# Patient Record
Sex: FEMALE | Race: BLACK OR AFRICAN AMERICAN | NOT HISPANIC OR LATINO | URBAN - METROPOLITAN AREA
[De-identification: names, ages, dates, MRNs, and addresses within clinical notes are randomized per-mention and may not be internally consistent; named-entity substitution may affect disease eponyms.]

---

## 2021-12-18 ENCOUNTER — IMMUNIZATION (OUTPATIENT)
Dept: VACCINE CLINIC | Facility: HOSPITAL | Age: 51
End: 2021-12-18

## 2021-12-18 PROCEDURE — 91300 HC SARSCOV02 VAC 30MCG/0.3ML IM: CPT | Performed by: INTERNAL MEDICINE

## 2021-12-18 PROCEDURE — 0004A HC ADM SARSCOV2 30MCG/0.3ML BOOSTER: CPT | Performed by: INTERNAL MEDICINE

## 2022-10-17 ENCOUNTER — TRANSCRIBE ORDERS (OUTPATIENT)
Dept: ADMINISTRATIVE | Facility: HOSPITAL | Age: 52
End: 2022-10-17

## 2022-10-17 DIAGNOSIS — R91.1 PULMONARY NODULE: Primary | ICD-10-CM

## 2023-07-25 ENCOUNTER — OFFICE VISIT (OUTPATIENT)
Dept: FAMILY MEDICINE CLINIC | Facility: CLINIC | Age: 53
End: 2023-07-25
Payer: COMMERCIAL

## 2023-07-25 VITALS
SYSTOLIC BLOOD PRESSURE: 114 MMHG | BODY MASS INDEX: 31.98 KG/M2 | RESPIRATION RATE: 16 BRPM | WEIGHT: 211 LBS | HEIGHT: 68 IN | TEMPERATURE: 98 F | DIASTOLIC BLOOD PRESSURE: 80 MMHG | OXYGEN SATURATION: 100 % | HEART RATE: 65 BPM

## 2023-07-25 DIAGNOSIS — M79.2 NEUROPATHIC PAIN: ICD-10-CM

## 2023-07-25 DIAGNOSIS — B02.8 HERPES ZOSTER WITH OTHER COMPLICATION: Primary | ICD-10-CM

## 2023-07-25 PROBLEM — I10 PRIMARY HYPERTENSION: Status: ACTIVE | Noted: 2023-07-25

## 2023-07-25 PROBLEM — H25.13 AGE-RELATED NUCLEAR CATARACT OF BOTH EYES: Status: ACTIVE | Noted: 2022-09-21

## 2023-07-25 PROBLEM — R73.03 PREDIABETES: Status: ACTIVE | Noted: 2023-07-25

## 2023-07-25 PROBLEM — B02.9 HERPES ZOSTER WITHOUT COMPLICATION: Status: ACTIVE | Noted: 2023-07-25

## 2023-07-25 PROBLEM — H52.4 PRESBYOPIA: Status: ACTIVE | Noted: 2022-09-21

## 2023-07-25 PROBLEM — E66.01 MORBID OBESITY: Status: ACTIVE | Noted: 2022-10-24

## 2023-07-25 PROCEDURE — 99203 OFFICE O/P NEW LOW 30 MIN: CPT | Performed by: NURSE PRACTITIONER

## 2023-07-25 RX ORDER — METHOCARBAMOL 750 MG/1
750 TABLET, FILM COATED ORAL
COMMUNITY
Start: 2023-07-05

## 2023-07-25 RX ORDER — GABAPENTIN 100 MG/1
100 CAPSULE ORAL 2 TIMES DAILY
Qty: 60 CAPSULE | Refills: 0 | Status: SHIPPED | OUTPATIENT
Start: 2023-07-25

## 2023-07-25 RX ORDER — LIDOCAINE 50 MG/G
1 PATCH TOPICAL EVERY 12 HOURS
Qty: 60 PATCH | Refills: 11 | COMMUNITY
Start: 2023-07-19 | End: 2024-07-18

## 2023-07-25 RX ORDER — AMLODIPINE BESYLATE 2.5 MG/1
TABLET ORAL
COMMUNITY
Start: 2023-07-09

## 2023-07-25 RX ORDER — PROGESTERONE 100 MG/1
CAPSULE ORAL
COMMUNITY
Start: 2023-07-08

## 2023-07-25 RX ORDER — DULOXETIN HYDROCHLORIDE 60 MG/1
60 CAPSULE, DELAYED RELEASE ORAL DAILY
Qty: 30 CAPSULE | Refills: 11 | COMMUNITY
Start: 2023-07-19 | End: 2024-07-18

## 2023-07-25 RX ORDER — VALACYCLOVIR HYDROCHLORIDE 500 MG/1
500 TABLET, FILM COATED ORAL 2 TIMES DAILY
Qty: 14 TABLET | Refills: 0 | Status: SHIPPED | OUTPATIENT
Start: 2023-07-25 | End: 2023-08-01

## 2023-07-25 RX ORDER — ATORVASTATIN CALCIUM 40 MG/1
TABLET, FILM COATED ORAL
COMMUNITY
Start: 2023-07-02

## 2023-07-25 RX ORDER — SEMAGLUTIDE 2.4 MG/.75ML
INJECTION, SOLUTION SUBCUTANEOUS
COMMUNITY
Start: 2023-07-03

## 2023-07-25 RX ORDER — SENNOSIDES 8.6 MG
CAPSULE ORAL
COMMUNITY

## 2023-07-25 NOTE — PROGRESS NOTES
Thao Wolfe is a 52 y.o.. female.     Patient here today to become established.    Patient here today due to ongoing shingles and neuropathic pain from shingles. Patient stating she originally went to  on  for pain in right hip and right leg and was diagnosed with OA of right leg; Patient stating she was given muscle relaxer and prednisone for treatment. Patient stating then she went to ER on  after developing rash to right leg. Patient stating they did U/S and ruled out DVT's. Patient stating she was given Valtrex and prednisone script and discharged.     Patient stating she has history of shingles when she was in her 30's, but not as severe as this episode.       The following portions of the patient's history were reviewed and updated as appropriate: allergies, current medications, past family history, past medical history, past social history, past surgical history and problem list.    Past Medical History:   Diagnosis Date    Age-related nuclear cataract of both eyes 2022    Last Assessment & Plan: Formatting of this note might be different from the original. Not visually significant. Recommended observation. Patient cleared for refraction and glasses.    Herpes zoster without complication 2023    Morbid obesity 10/24/2022    Prediabetes 2023    Presbyopia 2022    Last Assessment & Plan: Formatting of this note might be different from the original. Fit with [facility] CL OS. Will call back with progress OS. Finalized RX. Okay to order supply of lenses or send CL rx if patient is happy.    Primary hypertension 2023       Past Surgical History:   Procedure Laterality Date     SECTION      HYSTERECTOMY      full at age 39       Review of Systems   Constitutional:  Negative for fever.   Respiratory: Negative.     Cardiovascular: Negative.    Musculoskeletal:  Positive for arthralgias.     Allergies   Allergen Reactions    Codeine Nausea Only    Sulfa  "Antibiotics Nausea Only       Objective     Vitals:    07/25/23 1517 07/25/23 1549   BP: 140/70 114/80   Pulse: 65    Resp: 16    Temp: 98 °F (36.7 °C)    SpO2: 100%    Weight: 95.7 kg (211 lb)    Height: 172.7 cm (68\")      Body mass index is 32.08 kg/m².    Physical Exam  Vitals reviewed.   HENT:      Head: Normocephalic.   Eyes:      Pupils: Pupils are equal, round, and reactive to light.   Cardiovascular:      Rate and Rhythm: Normal rate and regular rhythm.   Pulmonary:      Effort: Pulmonary effort is normal.      Breath sounds: Normal breath sounds.   Musculoskeletal:         General: Normal range of motion.   Skin:     General: Skin is warm and dry.      Comments: Healing red macular rash noted to right thigh/lower leg   Neurological:      Mental Status: She is alert and oriented to person, place, and time.   Psychiatric:         Behavior: Behavior normal.         Current Outpatient Medications:     amLODIPine (NORVASC) 2.5 MG tablet, , Disp: , Rfl:     atorvastatin (LIPITOR) 40 MG tablet, , Disp: , Rfl:     lidocaine (LIDODERM) 5 %, Place 1 patch on the skin as directed by provider Every 12 (Twelve) Hours., Disp: 60 patch, Rfl: 11    metFORMIN (GLUCOPHAGE) 500 MG tablet, , Disp: , Rfl:     Progesterone (PROMETRIUM) 100 MG capsule, , Disp: , Rfl:     Wegovy 2.4 MG/0.75ML solution auto-injector, , Disp: , Rfl:     acetaminophen (TYLENOL) 650 MG 8 hr tablet, Take  by mouth. (Patient not taking: Reported on 7/25/2023), Disp: , Rfl:     DULoxetine (CYMBALTA) 60 MG capsule, Take 1 capsule by mouth Daily. (Patient not taking: Reported on 7/25/2023), Disp: 30 capsule, Rfl: 11    gabapentin (NEURONTIN) 100 MG capsule, Take 1 capsule by mouth 2 (Two) Times a Day., Disp: 60 capsule, Rfl: 0    methocarbamol (ROBAXIN) 750 MG tablet, Take 1 tablet by mouth. (Patient not taking: Reported on 7/25/2023), Disp: , Rfl:     valACYclovir (Valtrex) 500 MG tablet, Take 1 tablet by mouth 2 (Two) Times a Day for 7 days., Disp: 14 " tablet, Rfl: 0    Recent Results (from the past 2016 hour(s))   MAGNESIUM    Collection Time: 07/05/23  8:42 AM    Specimen: Fresh Frozen Plasma    Specimen type and source: Plasma, Blood   Result Value Ref Range    Magnesium 1.8 1.6 - 2.6 mg/dL   CBC AND DIFFERENTIAL    Collection Time: 07/05/23  8:42 AM    Specimen type and source: Whole Blood, Blood   Result Value Ref Range    WBC 7.30 4.5 - 11.0 10*3/uL    RBC 4.43 4.0 - 5.2 10*6/uL    Hemoglobin 12.3 12.0 - 16.0 g/dL    Hematocrit 39.6 36.0 - 46.0 %    MCV 89.4 80.0 - 100.0 fL    MCH 27.8 26.0 - 34.0 pg    MCHC 31.1 31.0 - 37.0 g/dL    RDW 15.9 12.0 - 16.8 %    Platelets 260 140 - 440 10*3/uL    MPV 9.6 8.4 - 12.4 fL    Differential Type [facility] CBC w/AutoDiff (arb'U)    Neutrophil Rel % 72.6 45 - 80 %    Lymphocyte Rel % 18.6 15 - 50 %    Monocyte Rel % 6.2 0 - 15 %    Eosinophil % 1.9 0 - 7 %    Basophil Rel % 0.3 0 - 2 %    Immature Grans % 0.4 0.0 - 1.0 %    nRBC 0 0 /100(WBC)    Neutrophils Absolute 5.30 2.0 - 8.8 10*3/uL    Lymphocytes Absolute 1.36 0.7 - 5.5 10*3/uL    Monocytes Absolute 0.45 0.0 - 1.7 10*3/uL    Eosinophils Absolute 0.14 0.0 - 0.8 10*3/uL    Basophils Absolute 0.02 0.0 - 0.2 10*3/uL    Immature Grans, Absolute 0.03 0.00 - 0.10 10*3/uL         Diagnoses and all orders for this visit:    1. Herpes zoster with other complication (Primary)  -     valACYclovir (Valtrex) 500 MG tablet; Take 1 tablet by mouth 2 (Two) Times a Day for 7 days.  Dispense: 14 tablet; Refill: 0    2. Neuropathic pain  -     gabapentin (NEURONTIN) 100 MG capsule; Take 1 capsule by mouth 2 (Two) Times a Day.  Dispense: 60 capsule; Refill: 0        Patient Instructions   Justice reviewed and approp  CSA signed today  Discussed shingles diagnosis, discussed neuropathic pain        Return for fasting labs, Annual physical.

## 2023-07-25 NOTE — PATIENT INSTRUCTIONS
Justice reviewed and approp  CSA signed today  Discussed shingles diagnosis, discussed neuropathic pain

## 2023-08-16 ENCOUNTER — HOSPITAL ENCOUNTER (OUTPATIENT)
Facility: HOSPITAL | Age: 53
Discharge: HOME | End: 2023-08-16
Payer: COMMERCIAL

## 2023-08-16 ENCOUNTER — APPOINTMENT (OUTPATIENT)
Dept: OTHER | Facility: HOSPITAL | Age: 53
End: 2023-08-16
Payer: COMMERCIAL

## 2023-08-16 DIAGNOSIS — R91.1 PULMONARY NODULE: ICD-10-CM

## 2023-08-16 PROCEDURE — 71250 CT THORAX DX C-: CPT

## 2023-08-22 DIAGNOSIS — R73.03 PREDIABETES: ICD-10-CM

## 2023-08-22 DIAGNOSIS — Z00.00 ANNUAL PHYSICAL EXAM: Primary | ICD-10-CM

## 2023-08-22 DIAGNOSIS — I10 PRIMARY HYPERTENSION: ICD-10-CM

## 2023-08-22 DIAGNOSIS — E55.9 VITAMIN D DEFICIENCY: ICD-10-CM

## 2025-08-22 ENCOUNTER — APPOINTMENT (OUTPATIENT)
Facility: HOSPITAL | Age: 55
End: 2025-08-22
Payer: OTHER GOVERNMENT

## 2025-08-22 ENCOUNTER — HOSPITAL ENCOUNTER (EMERGENCY)
Facility: HOSPITAL | Age: 55
Discharge: HOME | End: 2025-08-22
Attending: EMERGENCY MEDICINE
Payer: OTHER GOVERNMENT